# Patient Record
Sex: FEMALE | Race: WHITE | URBAN - METROPOLITAN AREA
[De-identification: names, ages, dates, MRNs, and addresses within clinical notes are randomized per-mention and may not be internally consistent; named-entity substitution may affect disease eponyms.]

---

## 2017-01-03 ENCOUNTER — TELEPHONE (OUTPATIENT)
Dept: INTERNAL MEDICINE CLINIC | Age: 37
End: 2017-01-03

## 2017-01-04 ENCOUNTER — TELEPHONE (OUTPATIENT)
Dept: INTERNAL MEDICINE CLINIC | Age: 37
End: 2017-01-04

## 2017-01-04 ENCOUNTER — OFFICE VISIT (OUTPATIENT)
Dept: INTERNAL MEDICINE CLINIC | Age: 37
End: 2017-01-04

## 2017-01-04 VITALS
HEIGHT: 62 IN | SYSTOLIC BLOOD PRESSURE: 104 MMHG | BODY MASS INDEX: 41.73 KG/M2 | TEMPERATURE: 97.8 F | DIASTOLIC BLOOD PRESSURE: 72 MMHG | HEART RATE: 104 BPM | WEIGHT: 226.8 LBS

## 2017-01-04 DIAGNOSIS — K52.9 GASTROENTERITIS: Primary | ICD-10-CM

## 2017-01-04 LAB
ANION GAP SERPL CALCULATED.3IONS-SCNC: 19 MMOL/L (ref 3–16)
BUN BLDV-MCNC: 20 MG/DL (ref 7–20)
CALCIUM SERPL-MCNC: 9.2 MG/DL (ref 8.3–10.6)
CHLORIDE BLD-SCNC: 101 MMOL/L (ref 99–110)
CO2: 18 MMOL/L (ref 21–32)
CREAT SERPL-MCNC: 1.3 MG/DL (ref 0.6–1.1)
GFR AFRICAN AMERICAN: 56
GFR NON-AFRICAN AMERICAN: 46
GLUCOSE BLD-MCNC: 164 MG/DL (ref 70–99)
POTASSIUM SERPL-SCNC: 6.2 MMOL/L (ref 3.5–5.1)
SODIUM BLD-SCNC: 138 MMOL/L (ref 136–145)

## 2017-01-04 PROCEDURE — 99213 OFFICE O/P EST LOW 20 MIN: CPT | Performed by: NURSE PRACTITIONER

## 2017-01-04 ASSESSMENT — ENCOUNTER SYMPTOMS: DIARRHEA: 1

## 2017-01-05 ENCOUNTER — TELEPHONE (OUTPATIENT)
Dept: INTERNAL MEDICINE CLINIC | Age: 37
End: 2017-01-05

## 2017-01-05 RX ORDER — SODIUM POLYSTYRENE SULFONATE 15 G/60ML
15 SUSPENSION ORAL; RECTAL ONCE
Qty: 1 BOTTLE | Refills: 0 | Status: SHIPPED | OUTPATIENT
Start: 2017-01-05 | End: 2017-01-05

## 2017-01-06 ENCOUNTER — TELEPHONE (OUTPATIENT)
Dept: INTERNAL MEDICINE CLINIC | Age: 37
End: 2017-01-06

## 2017-01-09 DIAGNOSIS — E87.5 HIGH POTASSIUM: Primary | ICD-10-CM

## 2017-01-10 LAB
ANION GAP SERPL CALCULATED.3IONS-SCNC: 17 MMOL/L (ref 3–16)
BUN BLDV-MCNC: 15 MG/DL (ref 7–20)
CALCIUM SERPL-MCNC: 9.3 MG/DL (ref 8.3–10.6)
CHLORIDE BLD-SCNC: 100 MMOL/L (ref 99–110)
CO2: 23 MMOL/L (ref 21–32)
CREAT SERPL-MCNC: 1.3 MG/DL (ref 0.6–1.1)
GFR AFRICAN AMERICAN: 56
GFR NON-AFRICAN AMERICAN: 46
GLUCOSE BLD-MCNC: 173 MG/DL (ref 70–99)
POTASSIUM SERPL-SCNC: 5.2 MMOL/L (ref 3.5–5.1)
SODIUM BLD-SCNC: 140 MMOL/L (ref 136–145)

## 2017-02-14 ENCOUNTER — TELEPHONE (OUTPATIENT)
Dept: INTERNAL MEDICINE CLINIC | Age: 37
End: 2017-02-14

## 2017-02-15 ENCOUNTER — TELEPHONE (OUTPATIENT)
Dept: INTERNAL MEDICINE CLINIC | Age: 37
End: 2017-02-15

## 2017-04-20 ENCOUNTER — TELEPHONE (OUTPATIENT)
Dept: INTERNAL MEDICINE CLINIC | Age: 37
End: 2017-04-20

## 2020-11-08 PROBLEM — E66.01 CLASS 3 SEVERE OBESITY WITH BODY MASS INDEX (BMI) OF 40.0 TO 44.9 IN ADULT (HCC): Status: ACTIVE | Noted: 2020-11-08

## 2020-11-08 PROBLEM — N18.30 CHRONIC KIDNEY DISEASE, STAGE 3 (HCC): Status: ACTIVE | Noted: 2020-11-08

## 2020-11-08 PROBLEM — E11.65 UNCONTROLLED TYPE 2 DIABETES MELLITUS WITH HYPERGLYCEMIA (HCC): Status: ACTIVE | Noted: 2020-11-08

## 2020-11-08 PROBLEM — E78.2 MIXED HYPERLIPIDEMIA: Status: ACTIVE | Noted: 2020-11-08

## 2020-11-08 PROBLEM — E03.9 ACQUIRED HYPOTHYROIDISM: Status: ACTIVE | Noted: 2020-11-08

## 2020-11-09 ENCOUNTER — OFFICE VISIT (OUTPATIENT)
Dept: ENDOCRINOLOGY | Age: 40
End: 2020-11-09
Payer: COMMERCIAL

## 2020-11-09 VITALS
SYSTOLIC BLOOD PRESSURE: 150 MMHG | WEIGHT: 253 LBS | DIASTOLIC BLOOD PRESSURE: 100 MMHG | HEART RATE: 102 BPM | BODY MASS INDEX: 49.67 KG/M2 | RESPIRATION RATE: 14 BRPM | TEMPERATURE: 97.3 F | OXYGEN SATURATION: 97 % | HEIGHT: 60 IN

## 2020-11-09 PROBLEM — E11.3299 TYPE 2 DIABETES, CONTROLLED, WITH MILD NONPROLIFERATIVE DIABETIC RETINOPATHY WITHOUT MACULAR EDEMA (HCC): Status: ACTIVE | Noted: 2020-11-09

## 2020-11-09 PROBLEM — R22.1 NECK NODULE: Status: ACTIVE | Noted: 2020-11-09

## 2020-11-09 PROBLEM — E11.21 CONTROLLED TYPE 2 DIABETES MELLITUS WITH DIABETIC NEPHROPATHY (HCC): Status: ACTIVE | Noted: 2020-11-09

## 2020-11-09 PROBLEM — D64.9 ANEMIA: Status: ACTIVE | Noted: 2020-11-09

## 2020-11-09 PROBLEM — E55.9 VITAMIN D DEFICIENCY: Status: ACTIVE | Noted: 2020-11-09

## 2020-11-09 PROBLEM — R94.6 ABNORMAL THYROID FUNCTION TEST: Status: ACTIVE | Noted: 2020-11-09

## 2020-11-09 PROBLEM — N25.81 HYPERPARATHYROIDISM, SECONDARY (HCC): Status: ACTIVE | Noted: 2020-11-09

## 2020-11-09 PROBLEM — E11.40 TYPE 2 DIABETES, CONTROLLED, WITH NEUROPATHY (HCC): Status: ACTIVE | Noted: 2020-11-09

## 2020-11-09 PROCEDURE — 95251 CONT GLUC MNTR ANALYSIS I&R: CPT | Performed by: INTERNAL MEDICINE

## 2020-11-09 PROCEDURE — 99204 OFFICE O/P NEW MOD 45 MIN: CPT | Performed by: INTERNAL MEDICINE

## 2020-11-09 RX ORDER — CANAGLIFLOZIN AND METFORMIN HYDROCHLORIDE 50; 500 MG/1; MG/1
TABLET, FILM COATED, EXTENDED RELEASE ORAL
COMMUNITY
End: 2020-11-09 | Stop reason: SDUPTHER

## 2020-11-09 RX ORDER — CANAGLIFLOZIN AND METFORMIN HYDROCHLORIDE 50; 500 MG/1; MG/1
1 TABLET, FILM COATED, EXTENDED RELEASE ORAL 2 TIMES DAILY
Qty: 60 TABLET | Refills: 3 | Status: SHIPPED | OUTPATIENT
Start: 2020-11-09

## 2020-11-09 RX ORDER — INSULIN LISPRO 100 [IU]/ML
INJECTION, SOLUTION INTRAVENOUS; SUBCUTANEOUS
Qty: 15 PEN | Refills: 3 | Status: SHIPPED | OUTPATIENT
Start: 2020-11-09

## 2020-11-09 NOTE — PROGRESS NOTES
Ambreen Newberry is a 36 y.o. female who is being evaluated for Type 2 diabetes mellitus. Current symptoms/problems include fatigue and show no change. Type 2 diabetes, controlled, with neuropathy ([facility] [address] [E11.40]    Diagnosed with Type 2 diabetes mellitus in 1995 at age 13. Comorbid conditions: Neuropathy, Nephropathy, Retinopathy and Chronic Kidney Disease    Current diabetic medications include: Levemir 35 units in the a.m. and 40 units in p.m., Humalog up to 24 units before meals, Invokamet ER 50/500 milligrams twice daily, bromocriptine 4 tablets daily    Intolerance to diabetes medications: Yes Metformin-kidney problems, Victoza-nausea, Actos- weight gain, Glucotrol hypoglycemia     Weight trend: gained 37 lbs while on insulin  Prior visit with dietician: yes  Current diet: on average, 3 meals per day  Current exercise: no     Current monitoring regimen: home blood tests - Dexcom continuous glucose monitoring  Has brought blood glucose log/meter:  Yes  Home blood sugar records: fasting range:  and postprandial range: 100-150   Any episodes of hypoglycemia? Yes  Hypoglycemia frequency and time(s):  daily  Does patient have Glucagon emergency kit? Yes  Does patient have rapid acting carbohydrate? Yes  Does patient wear a medic alert bracelet or necklace? No    2. Stage 3 chronic kidney disease, unspecified whether stage 3a or 3b CKD  No urination problems    3. Abnormal thyroid function test  Has fatigue  Patient states that her thyroid function tests fluctuated for a while, from abnormal to normal.  Most recent test was normal per patient report    4. Controlled type 2 diabetes mellitus with mild nonproliferative retinopathy without macular edema, with long-term current use of insulin, unspecified laterality (HCC)  No recent vision changes    5.  Class 3 severe obesity with serious comorbidity and body mass index (BMI) of 45.0 to 49.9 in adult, unspecified obesity type ([address]  Eats moderately healthy  Unable to exercise    6. Controlled type 2 diabetes mellitus with diabetic nephropathy, with long-term current use of insulin (HCC)  No urination problems    7. Hyperparathyroidism, secondary ([address]  No numbness or tingling    8. Anemia, unspecified type  Has fatigue    9. Neck nodule  No difficulty swallowing or voice change. No history of radiation to her neck  No family history of thyroid cancer    10. Vitamin D deficiency  Has fatigue    Interface, Results In - 06/25/2020  1:40 PM EDT  EXAM: CTA NECK W AND OR WO CONTRAST  IMPRESSION:    Neck  1.  Normal CTA of the neck with no evidence of dissection or pseudoaneurysm. No significant stenosis of the carotid or vertebral systems is identified. 2.  Elongated hyperdense nodule within the right prevertebral space, which is indeterminate however could potentially reflect an ectopic parathyroid gland. IMPRESSION:    Normal thyroid ultrasound. .    No parathyroid adenoma to the extent visualized. Nodule seen on the recent CT would not be visible by ultrasound given the prevertebral location. Consider nuclear medicine parathyroid scan with SPECT/CT    Report Verified by: Karthik Choi MD at 7/24/2020 10:14 AM EDT   Result Narrative   EXAM: US THYROID-NECK-HEAD     INDICATION:  Parathyroid abnormality (CMS Dx), Hypothyroidism, unspecified type    COMPARISON:  CTA neck 6/25/2020. TECHNIQUE:  Multiplanar grayscale analysis of the thyroid was performed. FINDINGS:    The thyroid gland appears normal in size, contour, and echogenicity bilaterally. Isthmus: 2 mm in anteroposterior dimension. No cysts or nodules are seen    Right Lobe: 4.5 x 1.5 x 1.3 cm. No cysts or nodules are seen    Left Lobe: 4.5 x 1.3 x 1.1 cm.  No cysts or nodules are seen   Other Result Information         Past Medical History:   Diagnosis Date    Bipolar disorder ([address]     Diabetes ([address]     Hypertension       Patient Active Problem List   Diagnosis    Bipolar affective disorder ([facility]    AGE (acute gastroenteritis)    Class 3 severe obesity with body mass index (BMI) of 45.0 to 49.9 in adult [facility])    Chronic kidney disease, stage 3    Type 2 diabetes, controlled, with mild nonproliferative diabetic retinopathy without macular edema (HCC)    Type 2 diabetes, controlled, with neuropathy ([facility]    Controlled type 2 diabetes mellitus with diabetic nephropathy (HCC)    Hyperparathyroidism, secondary ([facility]    Anemia    Neck nodule    Vitamin D deficiency    Abnormal thyroid function test     History reviewed. No pertinent surgical history.   Social History     Socioeconomic History    Marital status:      Spouse name: Not on file    Number of children: Not on file    Years of education: Not on file    Highest education level: Not on file   Occupational History    Not on file   Social Needs    Financial resource strain: Not on file    Food insecurity     Worry: Not on file     Inability: Not on file    Transportation needs     Medical: Not on file     Non-medical: Not on file   Tobacco Use    Smoking status: Never Smoker    Smokeless tobacco: Former User   Substance and Sexual Activity    Alcohol use: No    Drug use: No    Sexual activity: Yes     Partners: Male   Lifestyle    Physical activity     Days per week: Not on file     Minutes per session: Not on file    Stress: Not on file   Relationships    Social connections     Talks on phone: Not on file     Gets together: Not on file     Attends Confucianism service: Not on file     Active member of club or organization: Not on file     Attends meetings of clubs or organizations: Not on file     Relationship status: Not on file    Intimate partner violence     Fear of current or ex partner: Not on file     Emotionally abused: Not on file     Physically abused: Not on file     Forced sexual activity: Not on file   Other Topics Concern    Not on file   Social History Narrative    Not on file     Family History Problem Relation Age of Onset    Diabetes Mother     Diabetes Father     Diabetes Maternal Grandfather      Current Outpatient Medications   Medication Sig Dispense Refill    Canagliflozin-metFORMIN HCl ER (INVOKAMET XR)  MG TB24 Take 1 tablet by mouth 2 times daily 60 tablet 3    insulin lispro, 1 Unit Dial, (HUMALOG KWIKPEN) 100 UNIT/ML SOPN Up to 24 units before meals 15 pen 3    insulin detemir (LEVEMIR FLEXPEN) 100 UNIT/ML injection pen 35 unit in AM, 40 units in PM 15 pen 3    Bromocriptine Mesylate 0.8 MG TABS Takes 4 tablets daily 120 tablet 3    ondansetron (ZOFRAN ODT) 4 MG disintegrating tablet Take 1 tablet by mouth every 8 hours as needed for Nausea 12 tablet 0    Liraglutide (VICTOZA) 18 MG/3ML SOPN SC injection Inject 1.2 mg into the skin daily (Patient not taking: Reported on 11/9/2020) 2 Pen 3    sodium polystyrene (SPS) 15 GM/60ML suspension Take 60 mLs by mouth once for 1 dose 1 Bottle 0    dicyclomine (BENTYL) 10 MG capsule Take 1 capsule by mouth 4 times daily (before meals and nightly) (Patient not taking: Reported on 11/9/2020) 20 capsule 0    metFORMIN (GLUCOPHAGE) 1000 MG tablet Take 1 tablet by mouth 2 times daily (with meals) (Patient not taking: Reported on 11/9/2020) 60 tablet 3    atorvastatin (LIPITOR) 20 MG tablet Take 1 tablet by mouth daily (Patient not taking: Reported on 11/9/2020) 30 tablet 3    metoprolol (LOPRESSOR) 100 MG tablet Take 1 tablet by mouth 2 times daily (Patient not taking: Reported on 11/9/2020) 60 tablet 3    venlafaxine (EFFEXOR XR) 150 MG extended release capsule Take 1 capsule by mouth daily (Patient not taking: Reported on 11/9/2020) 30 capsule 3    venlafaxine (EFFEXOR XR) 75 MG extended release capsule Take 1 capsule by mouth daily (Patient not taking: Reported on 11/9/2020) 30 capsule 3    OLANZapine (ZYPREXA) 5 MG tablet Take 1 tablet by mouth nightly (Patient not taking: Reported on 11/9/2020) 30 tablet 3    lisinopril (PRINIVIL;ZESTRIL) 20 MG tablet Take 1 tablet by mouth daily (Patient not taking: Reported on 11/9/2020) 30 tablet 3    amLODIPine (NORVASC) 5 MG tablet Take 1 tablet by mouth daily (Patient not taking: Reported on 11/9/2020) 30 tablet 3    folic acid (FOLVITE) 1 MG tablet Take 1 tablet by mouth 2 times daily (Patient not taking: Reported on 11/9/2020) 30 tablet 3     No current facility-administered medications for this visit.       Allergies   Allergen Reactions    Aspirin Hives    Bactrim [Sulfamethoxazole-Trimethoprim]      Get \"Deathly\" sick     Sulfa Antibiotics Nausea Only     Family Status   Relation Name Status    Mother  (Not Specified)    Father  (Not Specified)    MGF  (Not Specified)       Lab Review:    Lab Results   Component Value Date    WBC 16.1 12/29/2016    HGB 9.7 12/29/2016    HCT 30.7 12/29/2016    MCV 87.1 12/29/2016     12/29/2016     Lab Results   Component Value Date     01/10/2017    K 5.2 01/10/2017     01/10/2017    CO2 23 01/10/2017    BUN 15 01/10/2017    CREATININE 1.3 01/10/2017    GLUCOSE 173 01/10/2017    CALCIUM 9.3 01/10/2017    LABGLOM 46 01/10/2017    GFRAA 56 01/10/2017     No results found for: TSHFT4, TSH, FT3  Lab Results   Component Value Date    LABA1C 8.0 11/18/2016     No results found for: EAG  No results found for: CHOL  No results found for: TRIG  No results found for: HDL  No results found for: LDLCHOLESTEROL, LDLCALC  No results found for: LABVLDL, VLDL  No results found for: CHOLHDLRATIO  Lab Results   Component Value Date    LABMICR YES 12/29/2016     No results found for: VITD25     Review of Systems:  Constitutional: has fatigue, no fever, has recent weight gain, no recent weight loss, no changes in appetite  Eyes: no eye pain, no change in vision, no eye redness, no eye irritation, no double vision  Ears, nose, throat: no nasal congestion, no sore throat, no earache, no decrease in hearing, no hoarseness, no dry mouth, no sinus problems, no difficulty swallowing, no ringing in ears  Pulmonary: no shortness of breath, no wheezing, no dyspnea on exertion, no cough  Cardiovascular: no chest pain, no lower extremity edema, no orthopnea, no intermittent leg claudication, no palpitations  Gastrointestinal: no abdominal pain, no nausea, no vomiting, no diarrhea, no constipation, no dysphagia, no heartburn, no bloating  Genitourinary: no dysuria, no urinary incontinence, no urinary hesitancy, no urinary frequency, no feelings of urinary urgency, no nocturia  Musculoskeletal: no joint swelling, no joint stiffness, no joint pain, no muscle cramps, no muscle pain, no bone pain  Integument/Breast: no hair loss, no skin rashes, no skin lesions, has itching, has dry skin  Neurological: no numbness, no tingling, no weakness, no confusion, no headaches, no dizziness, no fainting, no tremors, no decrease in memory, no balance problems  Psychiatric: has anxiety, has depression, no insomnia  Hematologic/Lymphatic: no tendency for easy bleeding, no swollen lymph nodes, no tendency for easy bruising  Immunology: has seasonal allergies, no frequent infections, no frequent illnesses  Endocrine: no temperature intolerance    BP (!) 150/100   Pulse 102   Temp 97.3 °F (36.3 °C)   Resp 14   Ht 5' (1.524 m)   Wt 253 lb (114.8 kg)   LMP 08/09/2020   SpO2 97%   BMI 49.41 kg/m²    Wt Readings from Last 3 Encounters:   11/09/20 253 lb (114.8 kg)   01/04/17 226 lb 12.8 oz (102.9 kg)   12/29/16 230 lb (104.3 kg)     Body mass index is 49.41 kg/m².       OBJECTIVE:  Constitutional: no acute distress, well appearing and well nourished  Psychiatric: oriented to person, place and time, judgement and insight and normal, recent and remote memory and intact and mood and affect are normal  Skin: skin and subcutaneous tissue is normal without mass, normal turgor  Head and Face: examination of head and face revealed no abnormalities  Eyes: no lid or conjunctival swelling, pen; Refill: 3  - Bromocriptine Mesylate 0.8 MG TABS; Takes 4 tablets daily  Dispense: 120 tablet; Refill: 3  - Hemoglobin A1C; Future  - Comprehensive Metabolic Panel; Future  - C-Peptide; Future  - Lipid Panel; Future  - Microalbumin / Creatinine Urine Ratio; Future    2. Stage 3 chronic kidney disease, unspecified whether stage 3a or 3b CKD  Follow with nephrologist  - Comprehensive Metabolic Panel; Future  - CBC; Future  - PTH, Intact; Future  - Phosphorus; Future  - Electrophoresis Protein, Serum without Reflex to Immunofixation; Future  - Immunofixation serum profile; Future    3. Abnormal thyroid function test  Very fluctuating TSH, elevated in the past, last TSH 4.3 on 8/10/2020  - T3, Free; Future  - T4, Free; Future  - TSH without Reflex; Future  - Anti-Thyroglobulin Antibody; Future  - Thyroid Peroxidase Antibody; Future    4. Controlled type 2 diabetes mellitus with mild nonproliferative retinopathy without macular edema, with long-term current use of insulin, unspecified laterality [facility])  Ophthalmology follow-up  - Hemoglobin A1C; Future  - Comprehensive Metabolic Panel; Future  - C-Peptide; Future  - Lipid Panel; Future  - Microalbumin / Creatinine Urine Ratio; Future    5. Class 3 severe obesity with serious comorbidity and body mass index (BMI) of 45.0 to 49.9 in adult, unspecified obesity type (HCC)  Eat healthy, activity as tolerated    6. Controlled type 2 diabetes mellitus with diabetic nephropathy, with long-term current use of insulin ([facility] [address]  Follow-up with nephrologist    7. Hyperparathyroidism, secondary ([facility] [address]    - CBC; Future  - Calcium Ionized Serum; Future  - PTH, Intact; Future  - Phosphorus; Future  - Magnesium; Future  - Vitamin D 25 Hydroxy; Future  - Electrophoresis Protein, Serum without Reflex to Immunofixation; Future  - Immunofixation serum profile; Future    8. Anemia, unspecified type    - CBC; Future    9.  Neck nodule  Patient was found elongated nodule within prevertebral space on CT of the neck  Per radiology, differential diagnosis includes ectopic, parathyroid gland. Calcium is low-low normal.  With normal calcium and elevated PTH it is a secondary hyperparathyroidism    10. Vitamin D deficiency  25 hydroxy vitamin D 28.2  -25 hydroxy vitamin D      Reviewed and/or ordered clinical lab results Yes  Reviewed and/or ordered radiology tests Yes  Reviewed and/or ordered other diagnostic tests No  Discussed test results with performing physician No  Independently reviewed image, tracing, or specimen   Made a decision to obtain old records No  Reviewed and summarized old records Yes   Hemoglobin 7.5  Calcium 8.4-8.5  Albumin 4.0    Creatinine 1.77-2.2  GFR 36-25    TSH 9.6  Hemoglobin A1c 6.4 in 2019  LDL 99  Triglycerides 168  TSH [id number] 4.74-5.34-6.89-9.6-4.3  Elevated microalbumin creatinine ratio    Obtained history from other than patient No    Kamini Wade TREVOR Crockett was counseled regarding symptoms of diabetes, abnormal thyroid function tests, hyperparathyroidism, hypothyroidism diagnosis, course and complications of disease if inadequately treated, side effects of medications, diagnosis, treatment options, and prognosis, risks, benefits, complications, and alternatives of treatment, labs, imaging and other studies and treatment targets and goals, diabetes complication prevention, diabetes complication management, abnormal thyroid function test work-up. She understands instructions and counseling. These diagnosis were discussed and reviewed with the patient including the advantages of drug therapy. She was counseled at this visit on the following: diabetes complication prevention and foot care. CGMS Download Review and Recommendations  See scanned document for tracing record  This was a separate service provided-CGM interpretation    Dexcom personal CGMS data downloaded and reviewed.     Average glucose 116± 33 SD  Time in range: 89%  Time above 180: 4%  Time under 70: 7%     Basal pattern review: Overall good basal pattern, with exception of nighttime low basal  Postprandial pattern review: No significant postprandial hyperglycemia  Hypoglycemia review: Frequent hypoglycemia at night, mostly between 1:30 and 2:30 AM  Activity related review: No activity recorded      Based on the data, I recommend:    1. Eat a healthy snack at bedtime to avoid nighttime hypoglycemia    2. Patient is not interested in insulin pump    3. If hypoglycemia at night persists, will need to decrease basal insulin    4. Send Dexcom download data every 2 weeks    5. Hypoglycemia management      Return in about 3 months (around 2/9/2021) for diabetes and sooner if needed.     Electronically signed by Arben Huddleston MD on 11/9/2020 at 9:56 PM

## 2020-11-10 ENCOUNTER — TELEPHONE (OUTPATIENT)
Dept: ENDOCRINOLOGY | Age: 40
End: 2020-11-10

## 2020-11-10 NOTE — TELEPHONE ENCOUNTER
I left patient a message that I reviewed [facility] results and radiology reports. No new orders at this time. Will discuss during appointment.